# Patient Record
Sex: FEMALE | Employment: OTHER | URBAN - METROPOLITAN AREA
[De-identification: names, ages, dates, MRNs, and addresses within clinical notes are randomized per-mention and may not be internally consistent; named-entity substitution may affect disease eponyms.]

---

## 2017-04-08 ENCOUNTER — HOSPITAL ENCOUNTER (OUTPATIENT)
Age: 57
Discharge: HOME | End: 2017-04-08
Payer: COMMERCIAL

## 2017-04-08 ENCOUNTER — APPOINTMENT (OUTPATIENT)
Dept: GENERAL RADIOLOGY | Age: 57
End: 2017-04-08
Attending: PHYSICIAN ASSISTANT
Payer: COMMERCIAL

## 2017-04-08 VITALS
OXYGEN SATURATION: 100 % | HEIGHT: 63 IN | BODY MASS INDEX: 30.48 KG/M2 | HEART RATE: 70 BPM | TEMPERATURE: 100 F | SYSTOLIC BLOOD PRESSURE: 145 MMHG | WEIGHT: 172 LBS | RESPIRATION RATE: 20 BRPM | DIASTOLIC BLOOD PRESSURE: 71 MMHG

## 2017-04-08 DIAGNOSIS — S52.125A CLOSED NONDISPLACED FRACTURE OF HEAD OF LEFT RADIUS, INITIAL ENCOUNTER: Primary | ICD-10-CM

## 2017-04-08 PROCEDURE — 73080 X-RAY EXAM OF ELBOW: CPT

## 2017-04-08 PROCEDURE — 99214 OFFICE O/P EST MOD 30 MIN: CPT

## 2017-04-08 PROCEDURE — 29105 APPLICATION LONG ARM SPLINT: CPT

## 2017-04-08 RX ORDER — HYDROCODONE BITARTRATE AND ACETAMINOPHEN 5; 325 MG/1; MG/1
1-2 TABLET ORAL EVERY 4 HOURS PRN
Qty: 20 TABLET | Refills: 0 | Status: SHIPPED | OUTPATIENT
Start: 2017-04-08 | End: 2017-04-15

## 2017-04-08 RX ORDER — DEXAMETHASONE 4 MG/1
TABLET ORAL AS NEEDED
Refills: 12 | COMMUNITY
Start: 2017-01-18

## 2017-04-08 RX ORDER — IBUPROFEN 600 MG/1
600 TABLET ORAL ONCE
Status: COMPLETED | OUTPATIENT
Start: 2017-04-08 | End: 2017-04-08

## 2017-04-08 RX ORDER — VENLAFAXINE HYDROCHLORIDE 37.5 MG/1
70 CAPSULE, EXTENDED RELEASE ORAL
Refills: 10 | COMMUNITY
Start: 2017-03-09

## 2017-04-08 RX ORDER — CALCIUM CARBONATE 500(1250)
TABLET ORAL
COMMUNITY

## 2017-04-08 NOTE — ED PROVIDER NOTES
Patient Seen in: [facility]    History   Patient presents with:  Elbow Pain    Stated Complaint: LT. ELBOW PAIN    HPI    Patient is a 25-year-old female who presents for evaluation of left elbow pain.   Patient states she Neurological: Negative. Hematological: Negative. Psychiatric/Behavioral: Negative. All other systems reviewed and are negative. Positive for stated complaint: LT. ELBOW PAIN  Other systems are as noted in HPI.   Constitutional and vital signs Neurological: She is alert and oriented to person, place, and time. She has normal reflexes. Skin: Skin is warm and dry. Psychiatric: She has a normal mood and affect.  Her behavior is normal. Judgment and thought content normal.   Nursing note and jessica

## 2017-04-08 NOTE — ED INITIAL ASSESSMENT (HPI)
Eleonora Rob outside while walking her dog. Dog pulled on leash. C/o left elbow pain. Limited ROM to elbow. Hx of previous fx. \"Scraped up knees\". No LOC. Denies head injury.

## 2017-04-13 ENCOUNTER — HOSPITAL ENCOUNTER (OUTPATIENT)
Age: 57
Discharge: HOME | End: 2017-04-13
Attending: EMERGENCY MEDICINE
Payer: COMMERCIAL

## 2017-04-13 ENCOUNTER — APPOINTMENT (OUTPATIENT)
Dept: GENERAL RADIOLOGY | Age: 57
End: 2017-04-13
Attending: EMERGENCY MEDICINE
Payer: COMMERCIAL

## 2017-04-13 VITALS
HEART RATE: 74 BPM | HEIGHT: 63 IN | DIASTOLIC BLOOD PRESSURE: 84 MMHG | OXYGEN SATURATION: 97 % | BODY MASS INDEX: 29.23 KG/M2 | WEIGHT: 165 LBS | SYSTOLIC BLOOD PRESSURE: 127 MMHG | RESPIRATION RATE: 18 BRPM | TEMPERATURE: 98 F

## 2017-04-13 DIAGNOSIS — S90.31XA CONTUSION OF RIGHT FOOT, INITIAL ENCOUNTER: Primary | ICD-10-CM

## 2017-04-13 PROCEDURE — 99213 OFFICE O/P EST LOW 20 MIN: CPT

## 2017-04-13 PROCEDURE — 73630 X-RAY EXAM OF FOOT: CPT

## 2017-04-13 RX ORDER — ACETAMINOPHEN 325 MG/1
325 TABLET ORAL EVERY 6 HOURS PRN
COMMUNITY

## 2017-04-13 RX ORDER — COVID-19 ANTIGEN TEST
220 KIT MISCELLANEOUS
COMMUNITY

## 2017-04-13 NOTE — ED INITIAL ASSESSMENT (HPI)
Pt reporting pain to right foot after tripping over dog gate yesterday. Right foot with edema and redness just below great toe. Painful with ambulation and weight bearing.

## 2017-04-13 NOTE — ED PROVIDER NOTES
Patient Seen in: [address] [facility]    History   Patient presents with:   Foot Pain    Stated Complaint: RT. FOOT PAIN    HPI    It is a 42-year-old female who presents to immediate care complaining of right foot pain after trippi arthralgias. Neurological: Negative. Positive for stated complaint: RT. FOOT PAIN  Other systems are as noted in HPI. Constitutional and vital signs reviewed. All other systems reviewed and negative except as noted above.     PSFH elements re Medication List

## 2018-06-24 ENCOUNTER — HOSPITAL ENCOUNTER (OUTPATIENT)
Age: 58
Discharge: HOME | End: 2018-06-24
Attending: FAMILY MEDICINE
Payer: COMMERCIAL

## 2018-06-24 ENCOUNTER — APPOINTMENT (OUTPATIENT)
Dept: GENERAL RADIOLOGY | Age: 58
End: 2018-06-24
Attending: FAMILY MEDICINE
Payer: COMMERCIAL

## 2018-06-24 DIAGNOSIS — N39.0 ACUTE UTI: Primary | ICD-10-CM

## 2018-06-24 PROCEDURE — 99214 OFFICE O/P EST MOD 30 MIN: CPT

## 2018-06-24 PROCEDURE — 87186 SC STD MICRODIL/AGAR DIL: CPT | Performed by: FAMILY MEDICINE

## 2018-06-24 PROCEDURE — 87077 CULTURE AEROBIC IDENTIFY: CPT | Performed by: FAMILY MEDICINE

## 2018-06-24 PROCEDURE — 74018 RADEX ABDOMEN 1 VIEW: CPT | Performed by: FAMILY MEDICINE

## 2018-06-24 PROCEDURE — 87086 URINE CULTURE/COLONY COUNT: CPT | Performed by: FAMILY MEDICINE

## 2018-06-24 PROCEDURE — 81002 URINALYSIS NONAUTO W/O SCOPE: CPT

## 2018-06-24 PROCEDURE — 81003 URINALYSIS AUTO W/O SCOPE: CPT

## 2018-06-24 RX ORDER — NITROFURANTOIN 25; 75 MG/1; MG/1
100 CAPSULE ORAL 2 TIMES DAILY
Qty: 14 CAPSULE | Refills: 0 | Status: SHIPPED | OUTPATIENT
Start: 2018-06-24 | End: 2018-07-01

## 2018-06-24 NOTE — ED PROVIDER NOTES
Patient presents with:  Urinary Symptoms (urologic)    HPI:     Bridger San is a 62year old female who presents with a chief complaint of suprapubic pressure, gross blood in the urine, dysuria, frequency, urgency of urination  Onset of symptoms: Matt Negative mg/dL   Glucose, Urine Negative Negative mg/dL   Ketone, Urine Trace (A) Negative mg/dL   Bilirubin, Urine Small (A) Negative   Blood, Urine Large (A) Negative   Nitrite Urine Positive (A) Negative   Urobilinogen urine <2.0 <2.0 mg/dL   Leukocyte Indication / Reason for Exam?          Answer: UTI      POCT Urinalysis Dipstick Once      Urine Culture, Routine Once      POCT Urine Dip      Nitrofurantoin Monohyd Macro 100 MG Oral Cap          Sig: Take 1 capsule (100 mg total) by mouth 2 (two) times

## 2018-06-24 NOTE — ED INITIAL ASSESSMENT (HPI)
PATIENT ARRIVED AMBULATORY TO ROOM C/O SYMPTOMS OF A UTI THAT STARTED TODAY. +LOWER ABDOMINAL PRESSURE. NO BACK PAIN. +HEMATURIA. +DYSURIA. +FREQUENCY. +URGENCY.

## 2018-08-02 ENCOUNTER — HOSPITAL ENCOUNTER (OUTPATIENT)
Dept: BONE DENSITY | Facility: HOSPITAL | Age: 58
Discharge: HOME | End: 2018-08-02
Attending: INTERNAL MEDICINE
Payer: COMMERCIAL

## 2018-08-02 ENCOUNTER — HOSPITAL ENCOUNTER (OUTPATIENT)
Dept: MAMMOGRAPHY | Facility: HOSPITAL | Age: 58
Discharge: HOME | End: 2018-08-02
Attending: INTERNAL MEDICINE
Payer: COMMERCIAL

## 2018-08-02 DIAGNOSIS — Z13.820 SCREENING FOR OSTEOPOROSIS: ICD-10-CM

## 2018-08-02 DIAGNOSIS — Z12.31 VISIT FOR SCREENING MAMMOGRAM: ICD-10-CM

## 2018-08-02 PROCEDURE — 77067 SCR MAMMO BI INCL CAD: CPT | Performed by: INTERNAL MEDICINE

## 2018-08-02 PROCEDURE — 77080 DXA BONE DENSITY AXIAL: CPT | Performed by: INTERNAL MEDICINE

## 2019-03-28 ENCOUNTER — APPOINTMENT (OUTPATIENT)
Dept: GENERAL RADIOLOGY | Age: 59
End: 2019-03-28
Attending: NURSE PRACTITIONER
Payer: COMMERCIAL

## 2019-03-28 ENCOUNTER — HOSPITAL ENCOUNTER (OUTPATIENT)
Age: 59
Discharge: HOME | End: 2019-03-28
Payer: COMMERCIAL

## 2019-03-28 VITALS
HEART RATE: 62 BPM | TEMPERATURE: 98 F | SYSTOLIC BLOOD PRESSURE: 150 MMHG | RESPIRATION RATE: 18 BRPM | OXYGEN SATURATION: 100 % | DIASTOLIC BLOOD PRESSURE: 78 MMHG | HEIGHT: 67 IN | WEIGHT: 170 LBS | BODY MASS INDEX: 26.68 KG/M2

## 2019-03-28 DIAGNOSIS — M77.9 TENDINITIS: Primary | ICD-10-CM

## 2019-03-28 PROCEDURE — 99213 OFFICE O/P EST LOW 20 MIN: CPT

## 2019-03-28 PROCEDURE — 73030 X-RAY EXAM OF SHOULDER: CPT | Performed by: NURSE PRACTITIONER

## 2019-03-28 RX ORDER — BIOTIN 1 MG
1 TABLET ORAL DAILY
COMMUNITY

## 2019-03-28 RX ORDER — NAPROXEN 500 MG/1
500 TABLET ORAL 2 TIMES DAILY PRN
Qty: 20 TABLET | Refills: 0 | Status: SHIPPED | OUTPATIENT
Start: 2019-03-28 | End: 2019-04-04

## 2019-03-28 NOTE — ED PROVIDER NOTES
No chief complaint on file. HPI:     Jorge Herrera is a 62year old female with no significant past medical history presents with a chief complaint of left shoulder pain.   Patient reports she broke her wrist a few months ago and was in a right wrist murmur  MDM/Assessment/Plan:   Orders for this encounter:  Xray and re-evaluate. X-ray reviewed, minimal calcification of the distal supraspinous tendon raising the possibility of calcified tendinitis. Discussed these findings with patient.   Patient c

## 2019-03-28 NOTE — ED INITIAL ASSESSMENT (HPI)
Patient states having left upper arm pain radiating to left shoulder starting last night. Patient states she inured her right arm after falling on ice and has been favoring left arm since end of January. Patient denies injury, denies chest pain or SOB.

## 2019-07-01 ENCOUNTER — OFFICE VISIT (OUTPATIENT)
Dept: ENDOCRINOLOGY CLINIC | Facility: CLINIC | Age: 59
End: 2019-07-01
Payer: COMMERCIAL

## 2019-07-01 ENCOUNTER — APPOINTMENT (OUTPATIENT)
Dept: LAB | Facility: HOSPITAL | Age: 59
End: 2019-07-01
Attending: INTERNAL MEDICINE
Payer: COMMERCIAL

## 2019-07-01 VITALS
SYSTOLIC BLOOD PRESSURE: 141 MMHG | DIASTOLIC BLOOD PRESSURE: 93 MMHG | BODY MASS INDEX: 27 KG/M2 | WEIGHT: 172.19 LBS | HEART RATE: 73 BPM

## 2019-07-01 DIAGNOSIS — E06.3 HASHIMOTO'S THYROIDITIS: Primary | ICD-10-CM

## 2019-07-01 DIAGNOSIS — E04.1 THYROID NODULE: ICD-10-CM

## 2019-07-01 DIAGNOSIS — M81.8 OTHER OSTEOPOROSIS WITHOUT CURRENT PATHOLOGICAL FRACTURE: ICD-10-CM

## 2019-07-01 DIAGNOSIS — E06.3 HASHIMOTO'S THYROIDITIS: ICD-10-CM

## 2019-07-01 LAB
PTH-INTACT SERPL-MCNC: 53.4 PG/ML (ref 18.5–88)
T4 FREE SERPL-MCNC: 0.9 NG/DL (ref 0.8–1.7)
TSI SER-ACNC: 0.78 MIU/ML (ref 0.36–3.74)

## 2019-07-01 PROCEDURE — 84439 ASSAY OF FREE THYROXINE: CPT

## 2019-07-01 PROCEDURE — 83970 ASSAY OF PARATHORMONE: CPT

## 2019-07-01 PROCEDURE — 82306 VITAMIN D 25 HYDROXY: CPT

## 2019-07-01 PROCEDURE — 84443 ASSAY THYROID STIM HORMONE: CPT

## 2019-07-01 PROCEDURE — 99204 OFFICE O/P NEW MOD 45 MIN: CPT | Performed by: INTERNAL MEDICINE

## 2019-07-01 PROCEDURE — 84080 ASSAY ALKALINE PHOSPHATASES: CPT

## 2019-07-01 PROCEDURE — 36415 COLL VENOUS BLD VENIPUNCTURE: CPT

## 2019-07-01 NOTE — PROGRESS NOTES
Name: Seamus Martin  Date: 7/1/2019    Referring Physician: No ref.  provider found    Patient presents with:  Consult: PCP advised pt to see Endocrinologist due to abnormal blood work       HISTORY OF PRESENT ILLNESS   Seamus Martin is a 62year old fe Rfl:   •  Naproxen Sodium (ALEVE) 220 MG Oral Cap, Take 220 mg by mouth., Disp: , Rfl:   •  Diazepam (VALIUM OR), , Disp: , Rfl:   •  Progesterone Micronized Does not apply Powder, , Disp: , Rfl:   •  Multiple Vitamins-Minerals (MULTIVITAMIN OR), Take  by monitor to evaluate for hypothyroidism  - Repeat TSH, FT4 to follow pattern    2.  Thyroid Nodules  -Discussed common occurrence of thyroid nodules in the population approx 50-60% of the population  -Discussed risk of malignancy is approx 3-5%, 95-97% of no

## 2019-07-03 LAB
25(OH)D3 SERPL-MCNC: 46.2 NG/ML (ref 30–100)
BONE SPECIFIC ALKALINE PHOSPHATASE: 11.5 UG/L

## 2019-07-08 ENCOUNTER — TELEPHONE (OUTPATIENT)
Dept: ENDOCRINOLOGY CLINIC | Facility: CLINIC | Age: 59
End: 2019-07-08

## 2019-07-08 NOTE — TELEPHONE ENCOUNTER
Please call patient - her labs do demonstrate an elevated rate of bone loss. Her vitamin D level is normal.  Her thyroid levels are normal.  Given her recurrent fractures I would recommend starting prolia therapy to help protect the bone. Thanks.

## 2019-07-09 NOTE — TELEPHONE ENCOUNTER
Spoke w/ Sandi Gonzalez. She verbalizes understanding of lab results. She is agreeable to starting Prolia therapy. Discussed that we will submit insurance verification and will call once complete. Prolia IV faxed to [facility].

## 2019-07-16 NOTE — IMAGING NOTE
Spoke with patient to verify no use of NSAID products or anticoagulants. Instructed to stop supplements as well. Patient denies allergies. Aware of arrival time.

## 2019-07-18 ENCOUNTER — HOSPITAL ENCOUNTER (OUTPATIENT)
Dept: ULTRASOUND IMAGING | Facility: HOSPITAL | Age: 59
Discharge: HOME | End: 2019-07-18
Attending: INTERNAL MEDICINE
Payer: COMMERCIAL

## 2019-07-18 VITALS
BODY MASS INDEX: 30.12 KG/M2 | WEIGHT: 170 LBS | SYSTOLIC BLOOD PRESSURE: 144 MMHG | HEIGHT: 63 IN | HEART RATE: 70 BPM | RESPIRATION RATE: 16 BRPM | DIASTOLIC BLOOD PRESSURE: 75 MMHG

## 2019-07-18 DIAGNOSIS — E04.1 THYROID NODULE: ICD-10-CM

## 2019-07-18 PROCEDURE — 88173 CYTOPATH EVAL FNA REPORT: CPT | Performed by: INTERNAL MEDICINE

## 2019-07-18 PROCEDURE — 88305 TISSUE EXAM BY PATHOLOGIST: CPT | Performed by: INTERNAL MEDICINE

## 2019-07-18 PROCEDURE — 88177 CYTP FNA EVAL EA ADDL: CPT | Performed by: INTERNAL MEDICINE

## 2019-07-18 PROCEDURE — 10005 FNA BX W/US GDN 1ST LES: CPT | Performed by: INTERNAL MEDICINE

## 2019-07-18 PROCEDURE — 88172 CYTP DX EVAL FNA 1ST EA SITE: CPT | Performed by: INTERNAL MEDICINE

## 2019-07-18 NOTE — IMAGING NOTE
[id number] Pt arrived to ultrasound room #4 scans taken by [facility], ultrasound technologist     History taken and as follows:  ABNORMAL US SCAN IN [facility] 2019. PALPABLE LEFT THYROID NODULE. Procedure explained questions answered.     1400 Consent verified and obta

## 2019-07-21 ENCOUNTER — TELEPHONE (OUTPATIENT)
Dept: ENDOCRINOLOGY CLINIC | Facility: CLINIC | Age: 59
End: 2019-07-21

## 2019-07-21 NOTE — TELEPHONE ENCOUNTER
Please call patient - good news, thyroid biopsy was benign and will plan to repeat US in one year to monitor. Thanks.

## 2019-08-23 ENCOUNTER — HOSPITAL ENCOUNTER (OUTPATIENT)
Dept: MAMMOGRAPHY | Facility: HOSPITAL | Age: 59
Discharge: HOME | End: 2019-08-23
Attending: INTERNAL MEDICINE
Payer: COMMERCIAL

## 2019-08-23 ENCOUNTER — HOSPITAL ENCOUNTER (OUTPATIENT)
Dept: ULTRASOUND IMAGING | Facility: HOSPITAL | Age: 59
Discharge: HOME | End: 2019-08-23
Attending: INTERNAL MEDICINE
Payer: COMMERCIAL

## 2019-08-23 DIAGNOSIS — N63.20 LEFT BREAST LUMP: ICD-10-CM

## 2019-08-23 DIAGNOSIS — R22.31 SKIN LUMP OF ARM, RIGHT: ICD-10-CM

## 2019-08-23 PROCEDURE — 76642 ULTRASOUND BREAST LIMITED: CPT | Performed by: INTERNAL MEDICINE

## 2019-08-23 PROCEDURE — 77066 DX MAMMO INCL CAD BI: CPT | Performed by: INTERNAL MEDICINE

## 2019-08-23 PROCEDURE — 77062 BREAST TOMOSYNTHESIS BI: CPT | Performed by: INTERNAL MEDICINE

## 2019-08-29 ENCOUNTER — TELEPHONE (OUTPATIENT)
Dept: ENDOCRINOLOGY CLINIC | Facility: CLINIC | Age: 59
End: 2019-08-29

## 2019-08-29 NOTE — TELEPHONE ENCOUNTER
Called to schedule apt with RN for prolia. No PA is needed. Pt is to owe 20% of cost. Pt is not sure if she still want's the injection. Pt has apt in Oct. With provider, pt would like to talk about prolia injection day of apt.

## 2019-10-07 ENCOUNTER — OFFICE VISIT (OUTPATIENT)
Dept: ENDOCRINOLOGY CLINIC | Facility: CLINIC | Age: 59
End: 2019-10-07
Payer: COMMERCIAL

## 2019-10-07 VITALS
HEART RATE: 64 BPM | SYSTOLIC BLOOD PRESSURE: 118 MMHG | BODY MASS INDEX: 30 KG/M2 | DIASTOLIC BLOOD PRESSURE: 79 MMHG | WEIGHT: 171.63 LBS

## 2019-10-07 DIAGNOSIS — M81.8 OTHER OSTEOPOROSIS WITHOUT CURRENT PATHOLOGICAL FRACTURE: Primary | ICD-10-CM

## 2019-10-07 PROCEDURE — 99213 OFFICE O/P EST LOW 20 MIN: CPT | Performed by: INTERNAL MEDICINE

## 2019-10-07 PROCEDURE — 96372 THER/PROPH/DIAG INJ SC/IM: CPT | Performed by: INTERNAL MEDICINE

## 2019-10-07 NOTE — PROGRESS NOTES
Name: India Trujillo  Date: 10/7/2019    Referring Physician: No ref. provider found    Patient presents with: Follow - Up: Hashimotos.        HISTORY OF PRESENT ILLNESS   India Trujillo is a 62year old female who presents for Patien  MCG/ACT Inhalation Aerosol, as needed.   , Disp: , Rfl: 12  •  Venlafaxine HCl ER 37.5 MG Oral Capsule SR 24 Hr, 70 mg.  , Disp: , Rfl: 10  •  PROAIR  (90 Base) MCG/ACT Inhalation Aero Soln, , Disp: , Rfl: 12  •  Progesterone Micronized Does positive antibody  - Discussed that currently thyroid function is normal  - Will plan to monitor to evaluate for hypothyroidism  - Normal TFTs     2.  Thyroid Nodules  -Discussed common occurrence of thyroid nodules in the population approx 50-60% of the po

## 2020-03-10 ENCOUNTER — TELEPHONE (OUTPATIENT)
Dept: ENDOCRINOLOGY CLINIC | Facility: CLINIC | Age: 60
End: 2020-03-10

## 2020-03-10 NOTE — TELEPHONE ENCOUNTER
Prolia IV pended, will await 2-5 days for SOB. When received, call patient to schedule a nurse visit after 4/8/2020. No labs needed.

## 2020-03-12 NOTE — TELEPHONE ENCOUNTER
Received letter from [address] [place] [address] stating that pt insurance policy has been terminated 03/10/20. Called to see if pt switched insurance. LMTCB.

## 2022-04-03 ENCOUNTER — HOSPITAL ENCOUNTER (OUTPATIENT)
Dept: MAMMOGRAPHY | Facility: HOSPITAL | Age: 62
End: 2022-04-03
Attending: INTERNAL MEDICINE
Payer: COMMERCIAL

## 2022-04-03 ENCOUNTER — HOSPITAL ENCOUNTER (OUTPATIENT)
Dept: MAMMOGRAPHY | Facility: HOSPITAL | Age: 62
Discharge: HOME | End: 2022-04-03
Attending: INTERNAL MEDICINE
Payer: COMMERCIAL

## 2022-04-03 DIAGNOSIS — Z12.31 ENCOUNTER FOR SCREENING MAMMOGRAM FOR MALIGNANT NEOPLASM OF BREAST: ICD-10-CM

## 2022-04-03 PROCEDURE — 77063 BREAST TOMOSYNTHESIS BI: CPT | Performed by: INTERNAL MEDICINE

## 2022-04-03 PROCEDURE — 77067 SCR MAMMO BI INCL CAD: CPT | Performed by: INTERNAL MEDICINE

## 2023-05-11 ENCOUNTER — LAB ENCOUNTER (OUTPATIENT)
Dept: LAB | Facility: HOSPITAL | Age: 63
End: 2023-05-11
Attending: INTERNAL MEDICINE
Payer: COMMERCIAL

## 2023-05-11 ENCOUNTER — OFFICE VISIT (OUTPATIENT)
Dept: ENDOCRINOLOGY CLINIC | Facility: CLINIC | Age: 63
End: 2023-05-11

## 2023-05-11 VITALS
SYSTOLIC BLOOD PRESSURE: 127 MMHG | DIASTOLIC BLOOD PRESSURE: 84 MMHG | HEART RATE: 73 BPM | BODY MASS INDEX: 30 KG/M2 | WEIGHT: 168 LBS

## 2023-05-11 DIAGNOSIS — E04.1 THYROID NODULE: Primary | ICD-10-CM

## 2023-05-11 DIAGNOSIS — E04.1 THYROID NODULE: ICD-10-CM

## 2023-05-11 DIAGNOSIS — M81.0 AGE-RELATED OSTEOPOROSIS WITHOUT CURRENT PATHOLOGICAL FRACTURE: ICD-10-CM

## 2023-05-11 DIAGNOSIS — E55.9 VITAMIN D DEFICIENCY: ICD-10-CM

## 2023-05-11 LAB
ALBUMIN SERPL-MCNC: 3.7 G/DL (ref 3.4–5)
ALBUMIN/GLOB SERPL: 0.9 {RATIO} (ref 1–2)
ALP LIVER SERPL-CCNC: 94 U/L
ALT SERPL-CCNC: 23 U/L
ANION GAP SERPL CALC-SCNC: 6 MMOL/L (ref 0–18)
AST SERPL-CCNC: 23 U/L (ref 15–37)
BILIRUB SERPL-MCNC: 1 MG/DL (ref 0.1–2)
BUN BLD-MCNC: 13 MG/DL (ref 7–18)
BUN/CREAT SERPL: 14.6 (ref 10–20)
CALCIUM BLD-MCNC: 9.2 MG/DL (ref 8.5–10.1)
CHLORIDE SERPL-SCNC: 106 MMOL/L (ref 98–112)
CO2 SERPL-SCNC: 27 MMOL/L (ref 21–32)
CREAT BLD-MCNC: 0.89 MG/DL
FASTING STATUS PATIENT QL REPORTED: YES
GFR SERPLBLD BASED ON 1.73 SQ M-ARVRAT: 73 ML/MIN/1.73M2 (ref 60–?)
GLOBULIN PLAS-MCNC: 3.9 G/DL (ref 2.8–4.4)
GLUCOSE BLD-MCNC: 93 MG/DL (ref 70–99)
OSMOLALITY SERPL CALC.SUM OF ELEC: 288 MOSM/KG (ref 275–295)
POTASSIUM SERPL-SCNC: 3.9 MMOL/L (ref 3.5–5.1)
PROT SERPL-MCNC: 7.6 G/DL (ref 6.4–8.2)
PTH-INTACT SERPL-MCNC: 77.2 PG/ML (ref 18.5–88)
SODIUM SERPL-SCNC: 139 MMOL/L (ref 136–145)
T4 FREE SERPL-MCNC: 0.9 NG/DL (ref 0.8–1.7)
TSI SER-ACNC: 1.01 MIU/ML (ref 0.36–3.74)
VIT D+METAB SERPL-MCNC: 28.3 NG/ML (ref 30–100)

## 2023-05-11 PROCEDURE — 84080 ASSAY ALKALINE PHOSPHATASES: CPT

## 2023-05-11 PROCEDURE — 3074F SYST BP LT 130 MM HG: CPT | Performed by: INTERNAL MEDICINE

## 2023-05-11 PROCEDURE — 83970 ASSAY OF PARATHORMONE: CPT

## 2023-05-11 PROCEDURE — 36415 COLL VENOUS BLD VENIPUNCTURE: CPT

## 2023-05-11 PROCEDURE — 3079F DIAST BP 80-89 MM HG: CPT | Performed by: INTERNAL MEDICINE

## 2023-05-11 PROCEDURE — 84443 ASSAY THYROID STIM HORMONE: CPT

## 2023-05-11 PROCEDURE — 84439 ASSAY OF FREE THYROXINE: CPT

## 2023-05-11 PROCEDURE — 82306 VITAMIN D 25 HYDROXY: CPT

## 2023-05-11 PROCEDURE — 80053 COMPREHEN METABOLIC PANEL: CPT

## 2023-05-11 PROCEDURE — 99203 OFFICE O/P NEW LOW 30 MIN: CPT | Performed by: INTERNAL MEDICINE

## 2023-05-17 LAB — ALKALINE PHOSPHATASE BONE SPECIFIC: 14.1 UG/L

## 2023-06-21 ENCOUNTER — HOSPITAL ENCOUNTER (OUTPATIENT)
Dept: BONE DENSITY | Facility: HOSPITAL | Age: 63
Discharge: HOME | End: 2023-06-21
Attending: INTERNAL MEDICINE
Payer: COMMERCIAL

## 2023-06-21 ENCOUNTER — HOSPITAL ENCOUNTER (OUTPATIENT)
Dept: ULTRASOUND IMAGING | Facility: HOSPITAL | Age: 63
Discharge: HOME | End: 2023-06-21
Attending: INTERNAL MEDICINE
Payer: COMMERCIAL

## 2023-06-21 DIAGNOSIS — E04.1 THYROID NODULE: ICD-10-CM

## 2023-06-21 DIAGNOSIS — M81.0 AGE-RELATED OSTEOPOROSIS WITHOUT CURRENT PATHOLOGICAL FRACTURE: ICD-10-CM

## 2023-06-21 PROCEDURE — 77080 DXA BONE DENSITY AXIAL: CPT | Performed by: INTERNAL MEDICINE

## 2023-06-21 PROCEDURE — 76536 US EXAM OF HEAD AND NECK: CPT | Performed by: INTERNAL MEDICINE

## 2023-06-29 ENCOUNTER — PATIENT MESSAGE (OUTPATIENT)
Dept: ENDOCRINOLOGY CLINIC | Facility: CLINIC | Age: 63
End: 2023-06-29

## 2023-06-30 RX ORDER — RISEDRONATE SODIUM 35 MG/1
35 TABLET, FILM COATED ORAL
Qty: 12 TABLET | Refills: 1 | Status: SHIPPED | OUTPATIENT
Start: 2023-06-30

## 2023-11-10 ENCOUNTER — HOSPITAL ENCOUNTER (OUTPATIENT)
Dept: MAMMOGRAPHY | Age: 63
Discharge: HOME | End: 2023-11-10
Attending: INTERNAL MEDICINE
Payer: COMMERCIAL

## 2023-11-10 DIAGNOSIS — Z12.31 ENCOUNTER FOR SCREENING MAMMOGRAM FOR MALIGNANT NEOPLASM OF BREAST: ICD-10-CM

## 2023-11-10 DIAGNOSIS — Z12.31 VISIT FOR SCREENING MAMMOGRAM: ICD-10-CM

## 2023-11-10 PROCEDURE — 77067 SCR MAMMO BI INCL CAD: CPT | Performed by: INTERNAL MEDICINE

## 2023-11-10 PROCEDURE — 77063 BREAST TOMOSYNTHESIS BI: CPT | Performed by: INTERNAL MEDICINE

## 2023-12-26 RX ORDER — RISEDRONATE SODIUM 35 MG/1
35 TABLET, FILM COATED ORAL
Qty: 12 TABLET | Refills: 1 | Status: SHIPPED | OUTPATIENT
Start: 2023-12-26

## 2024-08-05 RX ORDER — RISEDRONATE SODIUM 35 MG/1
35 TABLET, FILM COATED ORAL
Qty: 12 TABLET | Refills: 0 | Status: SHIPPED | OUTPATIENT
Start: 2024-08-05

## 2024-08-05 NOTE — TELEPHONE ENCOUNTER
Endocrine refill protocol for anti-resorptive and anabolic agents:    Protocol Criteria: passed.   Appointment with Endocrinology completed in the last 12 months or scheduled in the next 6 months     Verify appointment has been completed or scheduled in the appropriate timeline. If so can send a 90 day supply with 1 refill.   Confirm timeline for Tymlos and Forteo- patient's time on medication should not have exceeded 2 years.  Last completed office visit: 5/11/23  Next scheduled follow up:   Future Appointments   Date Time Provider Department Center   9/25/2024 10:45 AM Nataliia Gracia MD [facility]      Start date for Tymlos/Forteo: n/a

## 2024-09-25 ENCOUNTER — OFFICE VISIT (OUTPATIENT)
Dept: ENDOCRINOLOGY CLINIC | Facility: CLINIC | Age: 64
End: 2024-09-25

## 2024-09-25 ENCOUNTER — LAB ENCOUNTER (OUTPATIENT)
Dept: LAB | Age: 64
End: 2024-09-25
Attending: INTERNAL MEDICINE
Payer: COMMERCIAL

## 2024-09-25 VITALS
DIASTOLIC BLOOD PRESSURE: 81 MMHG | BODY MASS INDEX: 30 KG/M2 | WEIGHT: 169 LBS | HEART RATE: 69 BPM | SYSTOLIC BLOOD PRESSURE: 141 MMHG

## 2024-09-25 DIAGNOSIS — M81.0 AGE-RELATED OSTEOPOROSIS WITHOUT CURRENT PATHOLOGICAL FRACTURE: ICD-10-CM

## 2024-09-25 DIAGNOSIS — E04.1 THYROID NODULE: ICD-10-CM

## 2024-09-25 DIAGNOSIS — E55.9 VITAMIN D DEFICIENCY: ICD-10-CM

## 2024-09-25 DIAGNOSIS — E66.3 OVERWEIGHT (BMI 25.0-29.9): ICD-10-CM

## 2024-09-25 DIAGNOSIS — E04.1 THYROID NODULE: Primary | ICD-10-CM

## 2024-09-25 LAB
ANION GAP SERPL CALC-SCNC: 4 MMOL/L (ref 0–18)
BUN BLD-MCNC: 13 MG/DL (ref 9–23)
BUN/CREAT SERPL: 14.1 (ref 10–20)
CALCIUM BLD-MCNC: 10.1 MG/DL (ref 8.7–10.4)
CHLORIDE SERPL-SCNC: 106 MMOL/L (ref 98–112)
CO2 SERPL-SCNC: 29 MMOL/L (ref 21–32)
CREAT BLD-MCNC: 0.92 MG/DL
EGFRCR SERPLBLD CKD-EPI 2021: 70 ML/MIN/1.73M2 (ref 60–?)
FASTING STATUS PATIENT QL REPORTED: YES
GLUCOSE BLD-MCNC: 90 MG/DL (ref 70–99)
OSMOLALITY SERPL CALC.SUM OF ELEC: 288 MOSM/KG (ref 275–295)
POTASSIUM SERPL-SCNC: 4 MMOL/L (ref 3.5–5.1)
PTH-INTACT SERPL-MCNC: 45.6 PG/ML (ref 18.5–88)
SODIUM SERPL-SCNC: 139 MMOL/L (ref 136–145)
T3 SERPL-MCNC: 1.02 NG/ML (ref 0.6–1.81)
T3FREE SERPL-MCNC: 2.58 PG/ML (ref 2.4–4.2)
T4 FREE SERPL-MCNC: 1.1 NG/DL (ref 0.8–1.7)
T4 SERPL-MCNC: 6.4 UG/DL
THYROPEROXIDASE AB SERPL-ACNC: 3149 U/ML (ref ?–60)
TSI SER-ACNC: 0.88 MIU/ML (ref 0.55–4.78)
VIT D+METAB SERPL-MCNC: 35.9 NG/ML (ref 30–100)

## 2024-09-25 PROCEDURE — 82306 VITAMIN D 25 HYDROXY: CPT

## 2024-09-25 PROCEDURE — 84080 ASSAY ALKALINE PHOSPHATASES: CPT

## 2024-09-25 PROCEDURE — 84482 T3 REVERSE: CPT

## 2024-09-25 PROCEDURE — 80048 BASIC METABOLIC PNL TOTAL CA: CPT

## 2024-09-25 PROCEDURE — 84439 ASSAY OF FREE THYROXINE: CPT

## 2024-09-25 PROCEDURE — 86800 THYROGLOBULIN ANTIBODY: CPT

## 2024-09-25 PROCEDURE — 83970 ASSAY OF PARATHORMONE: CPT

## 2024-09-25 PROCEDURE — 86376 MICROSOMAL ANTIBODY EACH: CPT

## 2024-09-25 PROCEDURE — 99214 OFFICE O/P EST MOD 30 MIN: CPT | Performed by: INTERNAL MEDICINE

## 2024-09-25 PROCEDURE — 84443 ASSAY THYROID STIM HORMONE: CPT

## 2024-09-25 PROCEDURE — 36415 COLL VENOUS BLD VENIPUNCTURE: CPT

## 2024-09-25 PROCEDURE — 84481 FREE ASSAY (FT-3): CPT

## 2024-09-25 RX ORDER — PHENTERMINE HYDROCHLORIDE 15 MG/1
15 CAPSULE ORAL EVERY MORNING
Qty: 30 CAPSULE | Refills: 1 | Status: SHIPPED | OUTPATIENT
Start: 2024-09-25

## 2024-09-25 NOTE — PROGRESS NOTES
Name: Malina Rodrigues  Date: 9/25/2024    Referring Physician: No ref. provider found    Chief Complaint   Patient presents with    Thyroid Problem       HISTORY OF PRESENT ILLNESS   Malina Rodrigues is a 63 year old female who presents for   Chief Complaint   Patient presents with    Thyroid Problem     64 y/o F presents for follow up evaluation of thyroid disease.  She presented to PCP in 3/2019 due to lack of weight loss with keto diet. Therefore underwent blood work which demonstrated positive TPO Ab.  Thyroid US was also performed which demonstrated thyroid nodules.     She has undergone thyroid FNA which was benign in 2019.  She is still feeling well but concerned about side effects related to prolia therapy.  Normal thyroid levels and normal Vitamin D level.     Compression Symptoms:  Dysphagia: No  Dyspnea: No  Anterior Neck Pain: No  Voice Change: No    Maternal h/o thyroid disease     9/2024   She is feeling well. No recent falls or fractures.  She is taking Vitamin D 5000 units daily and occ calcium tablet.  Since last visit she has been started on Risedronate therapy and tolerating well.     Her Thyroid US 2023 also demonstrated several new nodules.  Normal TFTs.     No compression symptoms.        REVIEW OF SYSTEMS  Eyes: no change in vision  Neurologic: no headache, generalized or focal weakness or numbness.  Head: normal  ENT: normal  Lungs: no shortness of breath, wheezing or CEBALLOS  Cardiovascular:  no chest pain or palpitations  Gastrointestinal:  no abdominal pain, bowel movement problems  Musculoskeletal: no muscle pain or arthralgia  /Gyne: no frequency or discomfort while urinating  Psychiatric:  no acute distress, anxiety  or depression  Skin: normal moisturized skin    Medications:     Current Outpatient Medications:     risedronate 35 MG Oral Tab, Take 1 tablet (35 mg total) by mouth every 7 days., Disp: 12 tablet, Rfl: 0    Cholecalciferol (VITAMIN D3) 1000 units Oral Cap,  Take 1 tablet by mouth daily., Disp: , Rfl:     acetaminophen 325 MG Oral Tab, Take 1 tablet (325 mg total) by mouth every 6 (six) hours as needed for Pain., Disp: , Rfl:     Naproxen Sodium (ALEVE) 220 MG Oral Cap, Take 220 mg by mouth., Disp: , Rfl:     Calcium (RA CALCIUM) 500 MG Oral Tab, Take by mouth., Disp: , Rfl:     Venlafaxine HCl ER 37.5 MG Oral Capsule SR 24 Hr, 70 mg.  , Disp: , Rfl: 10    Progesterone Micronized Does not apply Powder, , Disp: , Rfl:     Multiple Vitamins-Minerals (MULTIVITAMIN OR), Take  by mouth., Disp: , Rfl:     FLOVENT  MCG/ACT Inhalation Aerosol, as needed.   (Patient not taking: Reported on 5/11/2023), Disp: , Rfl: 12    PROAIR  (90 Base) MCG/ACT Inhalation Aero Soln, , Disp: , Rfl: 12     Allergies:   No Known Allergies    Social History:   Social History     Socioeconomic History    Marital status:    Tobacco Use    Smoking status: Never    Smokeless tobacco: Never       Medical History:   Past Medical History:    Lateral epicondylitis of right elbow    Mal de debarquement    Osteopenia       Surgical history:   Past Surgical History:   Procedure Laterality Date    Fusion big toe,i-p joint      David localization wire 1 site left (cpt=19281)      benign       PHYSICAL EXAMINATION:  /81   Pulse 69   Wt 169 lb (76.7 kg)   BMI 29.94 kg/m²     General Appearance:  Alert, in no acute distress, well developed  Eyes: normal conjunctivae, sclera.  Ears/Nose/Mouth/Throat/Neck:  normal hearing, normal speech and irregular thyroid gland  Musculoskeletal:  normal muscle strength and tone  PV: normal pulses of carotids, pedals  Skin:  normal moisture and skin texture  Hair & Nails:  normal scalp hair     Neuro:  sensory grossly intact and motor grossly intact  Psychiatric:  oriented to time, self, and place  Nutritional:  no abnormal weight gain or loss    ASSESSMENT/PLAN:    1. Hashimoto's thyroiditis  - Discussed positive antibody  - Discussed that currently  thyroid function is normal  - Will plan to monitor to evaluate for hypothyroidism  - Normal TFTs   - Recheck TSH, FT4   - She would like more thyroid tests per request including total hormones and reverse T3, Discussed this might not be helpful but will check evaluation     2. Thyroid Nodules  -Discussed common occurrence of thyroid nodules in the population approx 50-60% of the population  -Discussed risk of malignancy is approx 3-5%, 95-97% of nodules are benign  -Discussed recommendation to perform FNA biopsy of nodules greater than 1cm in size  -Discussed that if nodule is benign then plan to follow with yearly thyroid ultrasound  -FNA is benign  -Recheck Thyroid US     3. Osteoporosis  - History of recurrent radius fractures  - Osteopenia on DEXA  - Normal labs  - She received one prolia injection 2019 but developed side effects  - DEXA demonstrated significant bone loss   - She has now been started on Risedronate therapy for the past year   - Recheck Vitamin D, Bone specific alk phos, PTH    3. Overweight  - Discussed low CHO diet  - Increase Protein  - Discussed GLP-1 vs. Phentermine  - Trial low dose Phentermine 15mg pO daily, verbalized understanding of risks and benefits     RTC 1 year       9/25/2024  Nataliia Gracia MD

## 2024-09-30 LAB — REVERSE T3: 15.3 NG/DL

## 2024-10-01 LAB — ALKALINE PHOSPHATASE BONE SPECIFIC: 10.1 UG/L

## 2024-10-10 LAB
LC THYROGLOBIN LCMS: 80.1 NG/ML
THYROGLOB AB: 4.4 IU/ML

## 2024-11-11 ENCOUNTER — HOSPITAL ENCOUNTER (OUTPATIENT)
Dept: ULTRASOUND IMAGING | Age: 64
Discharge: HOME | End: 2024-11-11
Attending: INTERNAL MEDICINE
Payer: COMMERCIAL

## 2024-11-11 DIAGNOSIS — E04.1 THYROID NODULE: ICD-10-CM

## 2024-11-11 PROCEDURE — 76536 US EXAM OF HEAD AND NECK: CPT | Performed by: INTERNAL MEDICINE

## 2024-11-11 RX ORDER — RISEDRONATE SODIUM 35 MG/1
35 TABLET, FILM COATED ORAL
Qty: 12 TABLET | Refills: 0 | Status: SHIPPED | OUTPATIENT
Start: 2024-11-11

## 2024-11-11 NOTE — TELEPHONE ENCOUNTER
Endocrine Refill protocol for oral medications    Protocol Criteria:  PASSED  Reason: N/A    If below requirement is met, send a 90-day supply with 1 refill per provider protocol.    Verify appointment with Endocrinology completed in the last 6 months or scheduled in the next 3 months.    Last completed office visit: 9/25/2024 Nataliia Gracia MD   Next scheduled Follow up: no future appt

## 2024-12-01 ENCOUNTER — PATIENT MESSAGE (OUTPATIENT)
Dept: ENDOCRINOLOGY CLINIC | Facility: CLINIC | Age: 64
End: 2024-12-01

## 2024-12-01 DIAGNOSIS — E66.9 OBESITY (BMI 30-39.9): Primary | ICD-10-CM

## 2024-12-04 RX ORDER — PHENTERMINE HYDROCHLORIDE 37.5 MG/1
37.5 TABLET ORAL
Qty: 30 TABLET | Refills: 0 | Status: SHIPPED | OUTPATIENT
Start: 2024-12-04

## 2025-02-17 RX ORDER — RISEDRONATE SODIUM 35 MG/1
35 TABLET, FILM COATED ORAL
Qty: 12 TABLET | Refills: 0 | Status: SHIPPED | OUTPATIENT
Start: 2025-02-17

## 2025-02-17 NOTE — TELEPHONE ENCOUNTER
Endocrine refill protocol for anti-resorptive and anabolic agents:     Protocol Criteria: PASSED Reason: N/A    If all below requirements are met, send a 90-day supply with 1 refill per provider protocol.    Verify appointment with Endocrinology completed in the last 12 months or scheduled in the next 6 months.  Confirm timeline for Tymlos and Forteo- patient's time on medication should not have exceeded 2 years.    Start date for Tymlos/Forteo: N/A  Last completed office visit:9/25/2024 Nataliia Gracia MD   Next scheduled follow up: No future appointments.

## 2025-05-19 NOTE — TELEPHONE ENCOUNTER
LOV: 9/25/2024  RTC: 1 year, no upcoming/MyChart sent  Last refill: 2/17/2025    Dr. Gracia-please review and sign pended prescription if agreeable.

## 2025-05-20 RX ORDER — RISEDRONATE SODIUM 35 MG/1
35 TABLET, FILM COATED ORAL
Qty: 12 TABLET | Refills: 0 | Status: SHIPPED | OUTPATIENT
Start: 2025-05-20

## 2025-07-10 ENCOUNTER — HOSPITAL ENCOUNTER (OUTPATIENT)
Dept: MAMMOGRAPHY | Age: 65
Discharge: HOME | End: 2025-07-10
Attending: INTERNAL MEDICINE
Payer: COMMERCIAL

## 2025-07-10 ENCOUNTER — HOSPITAL ENCOUNTER (OUTPATIENT)
Dept: BONE DENSITY | Age: 65
Discharge: HOME | End: 2025-07-10
Attending: INTERNAL MEDICINE
Payer: COMMERCIAL

## 2025-07-10 DIAGNOSIS — M85.80 OSTEOPENIA, UNSPECIFIED LOCATION: ICD-10-CM

## 2025-07-10 DIAGNOSIS — Z12.31 VISIT FOR SCREENING MAMMOGRAM: ICD-10-CM

## 2025-07-10 PROCEDURE — 77067 SCR MAMMO BI INCL CAD: CPT | Performed by: INTERNAL MEDICINE

## 2025-07-10 PROCEDURE — 77080 DXA BONE DENSITY AXIAL: CPT | Performed by: INTERNAL MEDICINE

## 2025-07-10 PROCEDURE — 77063 BREAST TOMOSYNTHESIS BI: CPT | Performed by: INTERNAL MEDICINE

## 2025-08-11 DIAGNOSIS — M81.0 AGE-RELATED OSTEOPOROSIS WITHOUT CURRENT PATHOLOGICAL FRACTURE: Primary | ICD-10-CM

## 2025-08-13 RX ORDER — RISEDRONATE SODIUM 35 MG/1
35 TABLET, FILM COATED ORAL
Qty: 12 TABLET | Refills: 1 | Status: SHIPPED | OUTPATIENT
Start: 2025-08-13

## (undated) NOTE — LETTER
4/7/2020              [address] [place] [address]         Dear Monie Woodson,    This letter is to inform you that our office has made several attempts to reach you by phone without success.   We were attempti

## (undated) NOTE — LETTER
[address] [place] [address] Rd, [place], [place]     AUTHORIZATION FOR SURGICAL OPERATION OR PROCEDURE    I hereby authorize                                     , my Physician(s) and whomever may be designated as the doctor's Assistant, to own blood, or a directed donor transfusion, I will discuss this with my Physician. 4. I consent to the photographing of procedure(s) to be performed for the purposes of advancing medicine, science and/or education, provided my identity is not revealed.  If _______________________________________________________________ ____________________________  (Witness signature)                                                                                                  (Date)                                (Time)

## (undated) NOTE — LETTER
Date & Time: 3/28/2019, 9:34 AM  Patient: Rosalind Drain  Encounter Provider(s):    HUNTER Braswell       To Whom It May Concern:    Rosalind Garcia was seen and treated in our department on 3/28/2019.  She cannot travel, which will require a ferreira

## (undated) NOTE — ED AVS SNAPSHOT
[facility] AND [facility] Immediate Care in [address] [address] [address] [place]    Phone:  [phone]    Fax:  [phone]           Natalia Steiner   MRN: [id number]    Department:  [facility] AND [facility] Immediate Care in [address] [place] [address]   Date of Visit:  4 related to the care you received in our Immediate Care. Please call our [address] at [phone]. Your Immediate Care team is here to serve you. You are our top priority. You were examined and treated today on an urgent basis only.   This was not I certified that I have received a copy of the aftercare instructions; that these instructions have been explained to me; all questions pertaining to these instructions have been answered in a satisfactory manner.         [email or web address] information, go to [email or web address] and click on the Sign Up Now link in the [employer] box. Enter your [employer] Activation Code exactly as it appears below along with your Zip Code and Date of Birth to complete the sign-up process.  If you do

## (undated) NOTE — ED AVS SNAPSHOT
[facility] AND [facility] Immediate Care in [address] [address] [address] [place]    Phone:  [phone]    Fax:  [phone]           Rey Allyssa   MRN: [id number]    Department:  [facility] AND [facility] Immediate Care in [address] [place] [address]   Date of Visit:  4 may not be covered by your plan. It is possible that the physician may not participate in your health insurance plan. This may result in a lower benefit level being available to you or other limited reimbursement.   The physician may seek payment directly If you have been prescribed any medication(s), please fill your prescription right away and begin taking the medication(s) as directed.   If you believe that any of the medications or instructions on this list is different from what your Primary Care doctor harming yourself, contact [facility] at [phone]. - If you don’t have insurance, Alphonso Spangler has partnered with Patient [address] to help you get signed up for insurance coverage.   Patient [place]